# Patient Record
Sex: MALE | Race: ASIAN | HISPANIC OR LATINO | Employment: UNEMPLOYED | URBAN - METROPOLITAN AREA
[De-identification: names, ages, dates, MRNs, and addresses within clinical notes are randomized per-mention and may not be internally consistent; named-entity substitution may affect disease eponyms.]

---

## 2019-10-02 ENCOUNTER — HISTORICAL (OUTPATIENT)
Dept: RADIOLOGY | Facility: HOSPITAL | Age: 24
End: 2019-10-02

## 2019-10-02 LAB
BUN SERPL-MCNC: 21 MG/DL (ref 7–18)
CALCIUM SERPL-MCNC: 9.4 MG/DL (ref 8.5–10.1)
CHLORIDE SERPL-SCNC: 108 MMOL/L (ref 98–107)
CO2 SERPL-SCNC: 27 MMOL/L (ref 21–32)
CREAT SERPL-MCNC: 1 MG/DL (ref 0.6–1.3)
CREAT/UREA NIT SERPL: 21
GLUCOSE SERPL-MCNC: 85 MG/DL (ref 74–106)
POTASSIUM SERPL-SCNC: 4 MMOL/L (ref 3.5–5.1)
SODIUM SERPL-SCNC: 140 MMOL/L (ref 136–145)

## 2019-10-24 ENCOUNTER — HISTORICAL (OUTPATIENT)
Dept: RADIOLOGY | Facility: HOSPITAL | Age: 24
End: 2019-10-24

## 2022-04-10 ENCOUNTER — HISTORICAL (OUTPATIENT)
Dept: ADMINISTRATIVE | Facility: HOSPITAL | Age: 27
End: 2022-04-10

## 2022-04-25 VITALS
WEIGHT: 127.63 LBS | SYSTOLIC BLOOD PRESSURE: 107 MMHG | HEIGHT: 67 IN | DIASTOLIC BLOOD PRESSURE: 71 MMHG | BODY MASS INDEX: 20.03 KG/M2

## 2022-09-08 ENCOUNTER — OFFICE VISIT (OUTPATIENT)
Dept: INTERNAL MEDICINE | Facility: CLINIC | Age: 27
End: 2022-09-08
Payer: MEDICAID

## 2022-09-08 VITALS
TEMPERATURE: 98 F | BODY MASS INDEX: 20.35 KG/M2 | RESPIRATION RATE: 18 BRPM | HEART RATE: 56 BPM | WEIGHT: 126.63 LBS | HEIGHT: 66 IN | SYSTOLIC BLOOD PRESSURE: 100 MMHG | DIASTOLIC BLOOD PRESSURE: 63 MMHG | OXYGEN SATURATION: 100 %

## 2022-09-08 DIAGNOSIS — R10.32 LEFT LOWER QUADRANT PAIN: Primary | ICD-10-CM

## 2022-09-08 DIAGNOSIS — Z13.29 SCREENING FOR THYROID DISORDER: ICD-10-CM

## 2022-09-08 DIAGNOSIS — Z13.21 ENCOUNTER FOR VITAMIN DEFICIENCY SCREENING: ICD-10-CM

## 2022-09-08 DIAGNOSIS — Z00.00 ENCOUNTER FOR WELLNESS EXAMINATION IN ADULT: Primary | ICD-10-CM

## 2022-09-08 DIAGNOSIS — N20.1 URETERIC STONE: ICD-10-CM

## 2022-09-08 DIAGNOSIS — N20.0 NEPHROLITHIASIS: ICD-10-CM

## 2022-09-08 DIAGNOSIS — N13.30 HYDRONEPHROSIS, UNSPECIFIED HYDRONEPHROSIS TYPE: ICD-10-CM

## 2022-09-08 DIAGNOSIS — R30.0 DYSURIA: ICD-10-CM

## 2022-09-08 DIAGNOSIS — R63.0 LOSS OF APPETITE: ICD-10-CM

## 2022-09-08 DIAGNOSIS — N50.812 TESTICULAR PAIN, LEFT: ICD-10-CM

## 2022-09-08 DIAGNOSIS — Z13.1 SCREENING FOR DIABETES MELLITUS (DM): ICD-10-CM

## 2022-09-08 DIAGNOSIS — Z72.0 VAPES NICOTINE CONTAINING SUBSTANCE: ICD-10-CM

## 2022-09-08 DIAGNOSIS — Z11.3 ROUTINE SCREENING FOR STI (SEXUALLY TRANSMITTED INFECTION): ICD-10-CM

## 2022-09-08 DIAGNOSIS — Z13.220 SCREENING FOR LIPID DISORDERS: ICD-10-CM

## 2022-09-08 DIAGNOSIS — Z11.3 SCREENING EXAMINATION FOR STD (SEXUALLY TRANSMITTED DISEASE): ICD-10-CM

## 2022-09-08 PROCEDURE — 1159F PR MEDICATION LIST DOCUMENTED IN MEDICAL RECORD: ICD-10-PCS | Mod: CPTII,,,

## 2022-09-08 PROCEDURE — 3074F PR MOST RECENT SYSTOLIC BLOOD PRESSURE < 130 MM HG: ICD-10-PCS | Mod: CPTII,,,

## 2022-09-08 PROCEDURE — 1160F RVW MEDS BY RX/DR IN RCRD: CPT | Mod: CPTII,,,

## 2022-09-08 PROCEDURE — 99406 PR TOBACCO USE CESSATION INTERMEDIATE 3-10 MINUTES: ICD-10-PCS | Mod: S$PBB,,,

## 2022-09-08 PROCEDURE — 1160F PR REVIEW ALL MEDS BY PRESCRIBER/CLIN PHARMACIST DOCUMENTED: ICD-10-PCS | Mod: CPTII,,,

## 2022-09-08 PROCEDURE — 1159F MED LIST DOCD IN RCRD: CPT | Mod: CPTII,,,

## 2022-09-08 PROCEDURE — 99215 OFFICE O/P EST HI 40 MIN: CPT | Mod: PBBFAC

## 2022-09-08 PROCEDURE — 3078F DIAST BP <80 MM HG: CPT | Mod: CPTII,,,

## 2022-09-08 PROCEDURE — 99406 BEHAV CHNG SMOKING 3-10 MIN: CPT | Mod: S$PBB,,,

## 2022-09-08 PROCEDURE — 3078F PR MOST RECENT DIASTOLIC BLOOD PRESSURE < 80 MM HG: ICD-10-PCS | Mod: CPTII,,,

## 2022-09-08 PROCEDURE — 3074F SYST BP LT 130 MM HG: CPT | Mod: CPTII,,,

## 2022-09-08 PROCEDURE — 99204 OFFICE O/P NEW MOD 45 MIN: CPT | Mod: S$PBB,25,,

## 2022-09-08 PROCEDURE — 3008F PR BODY MASS INDEX (BMI) DOCUMENTED: ICD-10-PCS | Mod: CPTII,,,

## 2022-09-08 PROCEDURE — 99204 PR OFFICE/OUTPT VISIT, NEW, LEVL IV, 45-59 MIN: ICD-10-PCS | Mod: S$PBB,25,,

## 2022-09-08 PROCEDURE — 3008F BODY MASS INDEX DOCD: CPT | Mod: CPTII,,,

## 2022-09-08 RX ORDER — TAMSULOSIN HYDROCHLORIDE 0.4 MG/1
0.4 CAPSULE ORAL DAILY
Qty: 90 CAPSULE | Refills: 1 | Status: SHIPPED | OUTPATIENT
Start: 2022-09-08 | End: 2022-12-08

## 2022-09-08 RX ORDER — MEGESTROL ACETATE 40 MG/ML
200 SUSPENSION ORAL DAILY
Qty: 150 ML | Refills: 11 | Status: SHIPPED | OUTPATIENT
Start: 2022-09-08 | End: 2023-09-08

## 2022-09-08 NOTE — ASSESSMENT & PLAN NOTE
Testicular US ordered-attempting to schedule today or tomorrow.   No masses noted upon palpation.  Cremasteric reflex negative.

## 2022-09-08 NOTE — ASSESSMENT & PLAN NOTE
H/o nephrolithisasis in 2019, CT abd/pelvis without contrast ordered.  Rx flomax.  Uric acid ordered.

## 2022-09-08 NOTE — PROGRESS NOTES
Subjective:       Patient ID: Fidel Wharton is a 27 y.o. male.    Chief Complaint: Establish Care, Abdominal Pain (LLQ), Dysuria, and Testicle Pain    HPI  Fidel Wharton is a 27 y.o.  male presenting in clinic today to Establish Care, Abdominal Pain (LLQ), Dysuria, and Testicle Pain. No previous PCP. PMH ureteral stone.     On 8/7/2019, CT abdomen showed Small right UVJ calculus with mild obstructive features. 10/3/2019-IV pyelogram shows Punctate nonobstructing right lower pole calcifications. IV pyelogram on 10/2/2019 Punctate nonobstructing right lower pole calcifications. He wa spreviously followed in [facility] urology clinic, he was started on flomax at that time with relief. He says he passed a few stones. He did not follow up since then. He states he is currently having LLQ pain. He feels his urinary stream is strong, but it does take a while for his stream to start. Denies blood or pink tinged urine. He is having left sided testicular pain. He is able to have and maintain an erection without pain. He denies blood tinged ejaculate or painful ejaculations.     He says he has not been having an appetite. He says he has lost approximately 10 pounds in 2-3 months. He says his stomach is empty, but he does not feel like eating.     Electric cigarette and marijuana use. Denies alcohol use. Declines flu or tetanus vaccines. Denies chest pain, shortness of breath, cough, headache, dizziness, weakness, nausea, vomiting, diarrhea, constipation, depression, anxiety, SI, and HI.    Prostate Cancer Screening - PSA ordered. Follow up annually.   Colon Cancer Screening - Deferred due to age. Will initiate testing at age 47.  Vaccinations: Flu - Declines. / Tetanus - Declines / Covid - 10/22/2021, 12/7/2021    Review of Systems   Constitutional:  Positive for appetite change and unexpected weight change.   HENT: Negative.     Eyes: Negative.    Respiratory: Negative.     Cardiovascular: Negative.    Gastrointestinal:   Positive for abdominal pain.   Endocrine: Negative.    Genitourinary:  Positive for dysuria and testicular pain. Negative for discharge, erectile dysfunction, hematuria, penile pain and scrotal swelling.   Musculoskeletal: Negative.    Integumentary:  Negative.   Allergic/Immunologic: Negative.    Neurological: Negative.    Hematological: Negative.    Psychiatric/Behavioral: Negative.     All other systems reviewed and are negative.      Objective:      Physical Exam  Exam conducted with a chaperone present (JEN Smith LPN).   Constitutional:       Appearance: Normal appearance. He is underweight.   HENT:      Head: Normocephalic.      Nose: Nose normal.      Mouth/Throat:      Mouth: Mucous membranes are moist.      Pharynx: Oropharynx is clear.   Eyes:      Extraocular Movements: Extraocular movements intact.      Conjunctiva/sclera: Conjunctivae normal.      Pupils: Pupils are equal, round, and reactive to light.   Cardiovascular:      Rate and Rhythm: Normal rate and regular rhythm.      Heart sounds: Normal heart sounds.   Pulmonary:      Effort: Pulmonary effort is normal.      Breath sounds: Normal breath sounds.   Abdominal:      General: Abdomen is flat. Bowel sounds are normal.      Palpations: Abdomen is soft.   Genitourinary:     Testes: Cremasteric reflex is present.         Left: Tenderness present. Mass or swelling not present. Left testis is descended.   Musculoskeletal:         General: Normal range of motion.      Cervical back: Normal range of motion.   Skin:     General: Skin is warm and dry.      Capillary Refill: Capillary refill takes less than 2 seconds.   Neurological:      General: No focal deficit present.      Mental Status: He is alert and oriented to person, place, and time.   Psychiatric:         Mood and Affect: Mood normal.       Assessment and Plan:       Problem List Items Addressed This Visit          Renal/    Nephrolithiasis     H/o nephrolithisasis in 2019, CT abd/pelvis  "without contrast ordered.  Rx flomax.  Uric acid ordered.         Dysuria     Palpable lymph node chains on each side of groin.  See "nephrolithiasis."  PSA and UA ordered.           Relevant Medications    tamsulosin (FLOMAX) 0.4 mg Cap    Other Relevant Orders    Ambulatory referral/consult to Urology    PSA, Screening    Uric Acid    Testicular pain, left     Testicular US ordered-attempting to schedule today or tomorrow.   No masses noted upon palpation.  Cremasteric reflex negative.           Relevant Orders    US Scrotum And Testicles       ID    Routine screening for STI (sexually transmitted infection)     HIV, syphilis, trichomoniasis, HSV ordered.         Relevant Orders    T.vaginalisisc, Amplified RNA    HSV 1 & 2, IgG       Endocrine    BMI 20.0-20.9, adult     Body mass index is 20.23 kg/m². (At goal).              GI    Left lower quadrant pain - Primary     See "nephrolithiasis."  No rebound tenderness.         Relevant Orders    CT Abdomen Pelvis  Without Contrast    Ambulatory referral/consult to Urology       Other    Loss of appetite     Rx megesterol.  Drink ensure or boost 2-3 times per day for added calories.  Daily weight.         Relevant Medications    megestroL (MEGACE) 400 mg/10 mL (40 mg/mL) Susp    Vapes nicotine containing substance     Vaping cessation discussed for 3 minutes.                 RTC in 3 months and prn.  Labs within a week of visit.    ELOY Bustos  9/8/22      "

## 2022-09-08 NOTE — ASSESSMENT & PLAN NOTE
"Palpable lymph node chains on each side of groin.  See "nephrolithiasis."  PSA and UA ordered.    "

## 2022-09-09 ENCOUNTER — PATIENT MESSAGE (OUTPATIENT)
Dept: INTERNAL MEDICINE | Facility: CLINIC | Age: 27
End: 2022-09-09
Payer: MEDICAID

## 2022-09-09 DIAGNOSIS — R31.21 ASYMPTOMATIC MICROSCOPIC HEMATURIA: ICD-10-CM

## 2022-09-09 DIAGNOSIS — Z00.00 ENCOUNTER FOR WELLNESS EXAMINATION IN ADULT: Primary | ICD-10-CM

## 2022-09-12 NOTE — TELEPHONE ENCOUNTER
Mr. Fidel Wharton did not read his portal message. Can we please relate the previous message to him?    Thank you,    HECTOR Tubbs

## 2022-09-23 NOTE — TELEPHONE ENCOUNTER
2 nd Attempted to contact pt at number listed in chart, no answer. Left a VM to call back to [facility].

## 2022-09-27 ENCOUNTER — PATIENT MESSAGE (OUTPATIENT)
Dept: INTERNAL MEDICINE | Facility: CLINIC | Age: 27
End: 2022-09-27
Payer: MEDICAID

## 2022-09-27 NOTE — TELEPHONE ENCOUNTER
3 rd Attempted to contact pt at number listed in chart, no answer. Left a VM to call back to [facility]. Pt letter mailed

## 2022-09-30 ENCOUNTER — PATIENT MESSAGE (OUTPATIENT)
Dept: INTERNAL MEDICINE | Facility: CLINIC | Age: 27
End: 2022-09-30
Payer: MEDICAID

## 2022-12-05 ENCOUNTER — HOSPITAL ENCOUNTER (OUTPATIENT)
Dept: RADIOLOGY | Facility: HOSPITAL | Age: 27
Discharge: HOME | End: 2022-12-05
Payer: MEDICAID

## 2022-12-05 DIAGNOSIS — R10.32 LEFT LOWER QUADRANT PAIN: ICD-10-CM

## 2022-12-05 PROCEDURE — 74176 CT ABD & PELVIS W/O CONTRAST: CPT | Mod: TC

## 2022-12-06 ENCOUNTER — PATIENT MESSAGE (OUTPATIENT)
Dept: INTERNAL MEDICINE | Facility: CLINIC | Age: 27
End: 2022-12-06
Payer: MEDICAID

## 2022-12-08 ENCOUNTER — OFFICE VISIT (OUTPATIENT)
Dept: UROLOGY | Facility: CLINIC | Age: 27
End: 2022-12-08
Payer: MEDICAID

## 2022-12-08 VITALS
WEIGHT: 131.81 LBS | DIASTOLIC BLOOD PRESSURE: 72 MMHG | TEMPERATURE: 98 F | OXYGEN SATURATION: 100 % | HEIGHT: 66 IN | HEART RATE: 71 BPM | BODY MASS INDEX: 21.18 KG/M2 | RESPIRATION RATE: 20 BRPM | SYSTOLIC BLOOD PRESSURE: 106 MMHG

## 2022-12-08 DIAGNOSIS — R10.32 LEFT LOWER QUADRANT PAIN: ICD-10-CM

## 2022-12-08 DIAGNOSIS — R30.0 DYSURIA: ICD-10-CM

## 2022-12-08 DIAGNOSIS — N20.0 KIDNEY STONES: Primary | ICD-10-CM

## 2022-12-08 PROCEDURE — 3074F SYST BP LT 130 MM HG: CPT | Mod: CPTII,,, | Performed by: NURSE PRACTITIONER

## 2022-12-08 PROCEDURE — 3078F PR MOST RECENT DIASTOLIC BLOOD PRESSURE < 80 MM HG: ICD-10-PCS | Mod: CPTII,,, | Performed by: NURSE PRACTITIONER

## 2022-12-08 PROCEDURE — 3008F BODY MASS INDEX DOCD: CPT | Mod: CPTII,,, | Performed by: NURSE PRACTITIONER

## 2022-12-08 PROCEDURE — 99204 PR OFFICE/OUTPT VISIT, NEW, LEVL IV, 45-59 MIN: ICD-10-PCS | Mod: S$PBB,,, | Performed by: NURSE PRACTITIONER

## 2022-12-08 PROCEDURE — 99214 OFFICE O/P EST MOD 30 MIN: CPT | Mod: PBBFAC | Performed by: NURSE PRACTITIONER

## 2022-12-08 PROCEDURE — 1160F PR REVIEW ALL MEDS BY PRESCRIBER/CLIN PHARMACIST DOCUMENTED: ICD-10-PCS | Mod: CPTII,,, | Performed by: NURSE PRACTITIONER

## 2022-12-08 PROCEDURE — 1159F MED LIST DOCD IN RCRD: CPT | Mod: CPTII,,, | Performed by: NURSE PRACTITIONER

## 2022-12-08 PROCEDURE — 1159F PR MEDICATION LIST DOCUMENTED IN MEDICAL RECORD: ICD-10-PCS | Mod: CPTII,,, | Performed by: NURSE PRACTITIONER

## 2022-12-08 PROCEDURE — 3078F DIAST BP <80 MM HG: CPT | Mod: CPTII,,, | Performed by: NURSE PRACTITIONER

## 2022-12-08 PROCEDURE — 3074F PR MOST RECENT SYSTOLIC BLOOD PRESSURE < 130 MM HG: ICD-10-PCS | Mod: CPTII,,, | Performed by: NURSE PRACTITIONER

## 2022-12-08 PROCEDURE — 1160F RVW MEDS BY RX/DR IN RCRD: CPT | Mod: CPTII,,, | Performed by: NURSE PRACTITIONER

## 2022-12-08 PROCEDURE — 99204 OFFICE O/P NEW MOD 45 MIN: CPT | Mod: S$PBB,,, | Performed by: NURSE PRACTITIONER

## 2022-12-08 PROCEDURE — 3008F PR BODY MASS INDEX (BMI) DOCUMENTED: ICD-10-PCS | Mod: CPTII,,, | Performed by: NURSE PRACTITIONER

## 2022-12-08 RX ORDER — KETOROLAC TROMETHAMINE 10 MG/1
10 TABLET, FILM COATED ORAL EVERY 6 HOURS
Qty: 24 TABLET | Refills: 0 | Status: SHIPPED | OUTPATIENT
Start: 2022-12-08 | End: 2022-12-13

## 2022-12-08 RX ORDER — TAMSULOSIN HYDROCHLORIDE 0.4 MG/1
0.4 CAPSULE ORAL DAILY
Qty: 30 CAPSULE | Refills: 11 | Status: SHIPPED | OUTPATIENT
Start: 2022-12-08 | End: 2023-02-06 | Stop reason: ALTCHOICE

## 2022-12-08 NOTE — PROGRESS NOTES
Chief Complaint:   Chief Complaint   Patient presents with    hematuria- burning in urine       HPI:  Patient is a 27-year-old male referred to Urology for hematuria and burning with urination.  Patient earlier last week burning with urination along with red blood gross hematuria.  A CT abdomen pelvis performed on 12/05/2022 revealed a stone at the left distal UVJ and multiple stone bilateral kidney 1 being a left hallux region.  Today patient denied dysuria, urinary urgency, frequency, incontinence, retention, gross hematuria, nocturia. Patient denies family history of urologic pathology.       Allergies:  Review of patient's allergies indicates:   Allergen Reactions    Corticosteroids (glucocorticoids) Hives       Medications:  Current Outpatient Medications   Medication Sig Dispense Refill    megestroL (MEGACE) 400 mg/10 mL (40 mg/mL) Susp Take 5 mLs (200 mg total) by mouth once daily. 150 mL 11    tamsulosin (FLOMAX) 0.4 mg Cap Take 1 capsule (0.4 mg total) by mouth once daily. 90 capsule 1     No current facility-administered medications for this visit.       Review of Systems:  General: No fever, chills, fatigability, or weight loss.  Skin: No rashes, itching, or changes in color or texture of skin.  Chest: Denies TOVAR, cyanosis, wheezing, cough, and sputum production.  Abdomen: Appetite fine. No weight loss. Denies diarrhea, abdominal pain, hematemesis, or blood in stool.  Musculoskeletal: No joint stiffness or swelling. Denies back pain.  : As above.  All other review of systems negative.    PMH:  Past Medical History:   Diagnosis Date    Hydronephrosis     RENAL    Ureteric stone        PSH:  Past Surgical History:   Procedure Laterality Date    FINGER SURGERY         FamHx:  No family history on file.    SocHx:  Social History     Socioeconomic History    Marital status:    Tobacco Use    Smoking status: Former     Types: Cigarettes, Vaping with nicotine     Passive exposure: Past    Smokeless  tobacco: Never   Substance and Sexual Activity    Alcohol use: Never    Drug use: Yes     Types: Marijuana    Sexual activity: Yes     Partners: Female       Physical Exam:  There were no vitals filed for this visit.  General: A&Ox3, no apparent distress, no deformities  Neck: No masses, normal thyroid  Lungs: CTA marilu, no use of accessory muscles  Heart: RRR, no arrhythmias  Abdomen: Soft, NT, ND, no masses, no hernias, no hepatosplenomegaly  Lymphatic: Neck and groin nodes negative  Skin: The skin is warm and dry. No jaundice.  Ext: No c/c/e.    GUMale: Test desc marilu, no abnormalities of epididymus. Penis, with normal penile and scrotal skin. Meatus normal. Normal rectal tone, no hemorrhoids. Prostate: finger breadth wide, smooth, soft, nontender, no nodules or masses appreciated. SV not palpable. Perineum and anus normal.        Imaging:  Abdomen pelvis without contrast revealed:There is no hydronephrosis.  There are several small calculi in both kidneys.  Largest in each kidney measures 3-4 mm.  No ureteral calculi are seen.      Impression:  Kidney stone      Plan: Instructed patient continue Flomax 0.4 mg p.o. daily and p.r.n. Toradol mg q.6 hours.  Strain all urine to collect kidney stones present. RTC 6 months with KUB.

## 2022-12-08 NOTE — PROGRESS NOTES
Placed in room. Seen by Mundo Smalls NP. Spoke with patient. Patient given strainer for kidney stones. Will f/u in 6 months with KUB.

## 2023-02-03 DIAGNOSIS — R31.21 ASYMPTOMATIC MICROSCOPIC HEMATURIA: ICD-10-CM

## 2023-02-03 DIAGNOSIS — Z00.00 ENCOUNTER FOR WELLNESS EXAMINATION IN ADULT: Primary | ICD-10-CM

## 2023-02-03 RX ORDER — OXYCODONE AND ACETAMINOPHEN 5; 325 MG/1; MG/1
TABLET ORAL
COMMUNITY
Start: 2022-10-05 | End: 2023-02-06 | Stop reason: ALTCHOICE

## 2023-02-03 RX ORDER — TALC
3 POWDER (GRAM) TOPICAL NIGHTLY
COMMUNITY

## 2023-02-03 RX ORDER — ONDANSETRON 4 MG/1
4 TABLET, ORALLY DISINTEGRATING ORAL EVERY 6 HOURS PRN
COMMUNITY
Start: 2022-10-05 | End: 2023-02-06 | Stop reason: SDUPTHER

## 2023-02-06 ENCOUNTER — OFFICE VISIT (OUTPATIENT)
Dept: INTERNAL MEDICINE | Facility: CLINIC | Age: 28
End: 2023-02-06
Payer: MEDICAID

## 2023-02-06 VITALS
HEIGHT: 66 IN | DIASTOLIC BLOOD PRESSURE: 64 MMHG | RESPIRATION RATE: 18 BRPM | BODY MASS INDEX: 21.04 KG/M2 | WEIGHT: 130.94 LBS | OXYGEN SATURATION: 96 % | HEART RATE: 70 BPM | SYSTOLIC BLOOD PRESSURE: 100 MMHG | TEMPERATURE: 98 F

## 2023-02-06 DIAGNOSIS — Z13.1 SCREENING FOR DIABETES MELLITUS: ICD-10-CM

## 2023-02-06 DIAGNOSIS — Z13.29 SCREENING FOR THYROID DISORDER: ICD-10-CM

## 2023-02-06 DIAGNOSIS — R31.9 HEMATURIA, UNSPECIFIED TYPE: Primary | ICD-10-CM

## 2023-02-06 DIAGNOSIS — Z13.220 SCREENING FOR LIPID DISORDERS: ICD-10-CM

## 2023-02-06 DIAGNOSIS — R11.2 NAUSEA AND VOMITING, UNSPECIFIED VOMITING TYPE: ICD-10-CM

## 2023-02-06 DIAGNOSIS — D64.9 NORMOCYTIC ANEMIA: ICD-10-CM

## 2023-02-06 DIAGNOSIS — D72.819 LEUKOPENIA, UNSPECIFIED TYPE: ICD-10-CM

## 2023-02-06 DIAGNOSIS — Z13.21 ENCOUNTER FOR VITAMIN DEFICIENCY SCREENING: ICD-10-CM

## 2023-02-06 PROBLEM — D70.9 NEUTROPENIA: Status: ACTIVE | Noted: 2023-02-06

## 2023-02-06 PROCEDURE — 3074F SYST BP LT 130 MM HG: CPT | Mod: CPTII,,,

## 2023-02-06 PROCEDURE — 3074F PR MOST RECENT SYSTOLIC BLOOD PRESSURE < 130 MM HG: ICD-10-PCS | Mod: CPTII,,,

## 2023-02-06 PROCEDURE — 3078F DIAST BP <80 MM HG: CPT | Mod: CPTII,,,

## 2023-02-06 PROCEDURE — 1159F PR MEDICATION LIST DOCUMENTED IN MEDICAL RECORD: ICD-10-PCS | Mod: CPTII,,,

## 2023-02-06 PROCEDURE — 99214 PR OFFICE/OUTPT VISIT, EST, LEVL IV, 30-39 MIN: ICD-10-PCS | Mod: 95,S$PBB,,

## 2023-02-06 PROCEDURE — 99214 OFFICE O/P EST MOD 30 MIN: CPT | Mod: 95,S$PBB,,

## 2023-02-06 PROCEDURE — 1159F MED LIST DOCD IN RCRD: CPT | Mod: CPTII,,,

## 2023-02-06 PROCEDURE — 3008F PR BODY MASS INDEX (BMI) DOCUMENTED: ICD-10-PCS | Mod: CPTII,,,

## 2023-02-06 PROCEDURE — 1160F PR REVIEW ALL MEDS BY PRESCRIBER/CLIN PHARMACIST DOCUMENTED: ICD-10-PCS | Mod: CPTII,,,

## 2023-02-06 PROCEDURE — 1160F RVW MEDS BY RX/DR IN RCRD: CPT | Mod: CPTII,,,

## 2023-02-06 PROCEDURE — 3078F PR MOST RECENT DIASTOLIC BLOOD PRESSURE < 80 MM HG: ICD-10-PCS | Mod: CPTII,,,

## 2023-02-06 PROCEDURE — 99214 OFFICE O/P EST MOD 30 MIN: CPT | Mod: PBBFAC

## 2023-02-06 PROCEDURE — 3008F BODY MASS INDEX DOCD: CPT | Mod: CPTII,,,

## 2023-02-06 RX ORDER — ONDANSETRON 4 MG/1
4 TABLET, ORALLY DISINTEGRATING ORAL EVERY 6 HOURS PRN
Qty: 45 TABLET | Refills: 1 | Status: SHIPPED | OUTPATIENT
Start: 2023-02-06

## 2023-02-06 NOTE — PROGRESS NOTES
PATIENT NAME: Fidel Wharton  : 1995  DATE: 23  MRN: [id number]          Reason for Visit/Chief Complaint   Follow-up, Labs Only, and Hematuria       History of Present Illness (HPI)     Fidel Wharton is a 28 y.o. male presenting in clinic today for Follow-up, Labs Only, and Hematuria  PMH ureteral stone. He is followed by [facility] urology clinic. He did not complete labs prior to visit. He is requesting a refill for ondansetron.     He states his appetite increased after taking megestrol. He states he had blood in urine approximately 2 weeks ago for approximately 5 days. CT abdomen/pelvis taken on 2022 reveals bilateral nephrolithiasis.  He reports continuing to strain his urine for stones, but has not seen a stone. He denies flank pain or dysuria.     Electric cigarette and marijuana use. Denies alcohol use. Declines flu or tetanus vaccines. Denies chest pain, shortness of breath, cough, headache, dizziness, weakness, nausea, vomiting, diarrhea, constipation, depression, anxiety, SI, and HI.     Prostate Cancer Screening - 2022-PSA: 0.32. Follow up annually.   Colon Cancer Screening - Deferred due to age. Will initiate testing at age 47.  Vaccinations: Flu - Declines. / Tetanus - Declines / Covid - 10/22/2021, 2021    Review of Systems     Review of Systems   Constitutional: Negative.    HENT: Negative.     Eyes: Negative.    Respiratory: Negative.     Cardiovascular: Negative.    Gastrointestinal: Negative.    Endocrine: Negative.    Genitourinary:  Positive for hematuria. Negative for difficulty urinating.   Musculoskeletal: Negative.    Skin: Negative.    Allergic/Immunologic: Negative.    Neurological: Negative.    Hematological: Negative.    Psychiatric/Behavioral: Negative.     All other systems reviewed and are negative.    Medical / Social / Family History     Past Medical History:   Diagnosis Date    Hydronephrosis     RENAL    Ureteric stone          Past Surgical History:  "  Procedure Laterality Date    FINGER SURGERY           Social History  Fidel Wharton's  reports that he has been smoking vaping with nicotine. He has been exposed to tobacco smoke. He has never used smokeless tobacco. He reports current drug use. Drug: Marijuana. He reports that he does not drink alcohol.    Family History  Fidel Wharton's family history is not on file.    Medications and Allergies     Medications  Current Outpatient Medications   Medication Instructions    megestroL (MEGACE) 200 mg, Oral, Daily    melatonin (MELATIN) 3 mg, Oral, Nightly    ondansetron (ZOFRAN-ODT) 4 mg, Oral, Every 6 hours PRN    tamsulosin (FLOMAX) 0.4 mg, Oral, Daily       Allergies  Review of patient's allergies indicates:   Allergen Reactions    Corticosteroids (glucocorticoids) Hives       Physical Examination   Visit Vitals  /64 (BP Location: Left arm, Patient Position: Sitting, BP Method: Small (Automatic))   Pulse 70   Temp 98.3 °F (36.8 °C) (Oral)   Resp 18   Ht 5' 6" (1.676 m)   Wt 59.4 kg (130 lb 15.3 oz)   SpO2 96%   BMI 21.14 kg/m²     Physical Exam  Vitals reviewed.   Constitutional:       Appearance: Normal appearance. He is normal weight.   HENT:      Head: Normocephalic.      Nose: Nose normal.      Mouth/Throat:      Mouth: Mucous membranes are moist.      Pharynx: Oropharynx is clear.   Eyes:      Extraocular Movements: Extraocular movements intact.      Conjunctiva/sclera: Conjunctivae normal.      Pupils: Pupils are equal, round, and reactive to light.   Cardiovascular:      Rate and Rhythm: Normal rate and regular rhythm.      Heart sounds: Normal heart sounds.   Pulmonary:      Effort: Pulmonary effort is normal.      Breath sounds: Normal breath sounds.   Abdominal:      General: Abdomen is flat. Bowel sounds are normal.      Palpations: Abdomen is soft.   Musculoskeletal:         General: Normal range of motion.      Cervical back: Normal range of motion.   Skin:     General: Skin is warm and dry.    "   Capillary Refill: Capillary refill takes less than 2 seconds.   Neurological:      General: No focal deficit present.      Mental Status: He is alert and oriented to person, place, and time.   Psychiatric:         Mood and Affect: Mood normal.         Results     Lab Results   Component Value Date    WBC 4.1 (L) 02/06/2023    RBC 4.22 (L) 02/06/2023    HGB 13.3 (L) 02/06/2023    HCT 38.2 (L) 02/06/2023    MCV 90.5 02/06/2023    MCH 31.5 02/06/2023    MCHC 34.8 02/06/2023    RDW 12.3 02/06/2023     02/06/2023    MPV 10.2 02/06/2023      Lab Results   Component Value Date     02/06/2023    K 3.7 02/06/2023    CHLORIDE 105 02/06/2023    CO2 26 02/06/2023    GLUCOSE 107 (H) 02/06/2023    BUN 16.1 02/06/2023    CREATININE 0.90 02/06/2023    LABPROT 7.3 02/06/2023    ALBUMIN 4.5 02/06/2023    BILITOT 0.4 02/06/2023    ALKPHOS 59 02/06/2023    AST 22 02/06/2023    ALT 25 02/06/2023    EGFRNORACEVR >60 02/06/2023     Lab Results   Component Value Date    TSH 2.1650 09/08/2022     Lab Results   Component Value Date    CHOL 135 09/08/2022    HDL 39 09/08/2022    LDL 79.00 09/08/2022    TRIG 85 09/08/2022     Lab Results   Component Value Date    COLORUA Light-Yellow 02/06/2023    SGUA 1.021 02/06/2023    PROTEINUA Negative 02/06/2023    GLUCOSEUA Normal 02/06/2023    BILIRUBINUA Negative 02/06/2023    BLOODUA Negative 02/06/2023    WBCUA 0-5 02/06/2023    RBCUA 0-5 02/06/2023    BACTERIA None Seen 02/06/2023    NITRITESUA Negative 02/06/2023    LEUKOCYTESUR Negative 02/06/2023    UROBILINOGEN Normal 02/06/2023     Lab Results   Component Value Date    CREATRANDUR 186.4 (H) 09/08/2022    MICALBCREAT 17.3 09/08/2022     Lab Results   Component Value Date    PWNVTTAL30HC 40.5 09/08/2022     Lab Results   Component Value Date    HIV Nonreactive 09/08/2022    HEPAIGM Nonreactive 09/08/2022    HEPBCOREM Nonreactive 09/08/2022    HEPCAB Nonreactive 09/08/2022     Assessment and Plan (including Health Maintenance)      1. Hematuria, unspecified type  - Microalbumin/Creatinine Ratio, Urine; Future  - Urinalysis; Future  - Urine culture; Future  - Cytology, Urine; Future    2. Normocytic anemia  Lab Results   Component Value Date    HCT 38.2 (L) 02/06/2023    HGB 13.3 (L) 02/06/2023    FERRITIN 129.58 09/08/2022    TIBC 303 09/08/2022    LABIRON 30 09/08/2022     - CBC Auto Differential; Future  - Ferritin; Future  - Iron and TIBC; Future  - Reticulocytes; Future  - Path Review, Peripheral Smear; Future  - Haptoglobin; Future    3. Leukopenia, unspecified type  - CBC Auto Differential; Future    4. Nausea and vomiting, unspecified vomiting type  Refilled today.   - ondansetron (ZOFRAN-ODT) 4 MG TbDL; Take 1 tablet (4 mg total) by mouth every 6 (six) hours as needed (nausea or vomiting).  Dispense: 45 tablet; Refill: 1  - Comprehensive Metabolic Panel; Future    5. BMI 21.0-21.9, adult  Body mass index is 21.14 kg/m². (At goal).     6. Screening for diabetes mellitus  - Hemoglobin A1C; Future    7. Screening for lipid disorders  - Lipid Panel; Future    8. Encounter for vitamin deficiency screening  - Vitamin D; Future    9. Screening for thyroid disorder  - T4, Free; Future  - TSH; Future      Health Maintenance Due   Topic Date Due    Pneumococcal Vaccines (Age 0-64) (1 - PCV) Never done    HPV Vaccines (1 - Male 2-dose series) Never done    COVID-19 Vaccine (3 - Booster for Pfizer series) 02/01/2022     All of your core healthy metrics are met.      The patient has no Health Maintenance topics of status Not Due    Future Appointments   Date Time Provider Department Center   6/8/2023  1:30 PM Ab Smalls NP [facility]   8/7/2023 12:00 PM ELOY Bustos [facility]        Follow up in about 6 months (around 8/6/2023) for F2F, Lab review, Wellness.  RTC PRN.  Labs within a week of visit.        Signature:        ELOY Bustos  [facility]  [address] [facility] [id number]    Date of encounter: 2/6/23

## 2023-02-08 ENCOUNTER — PATIENT MESSAGE (OUTPATIENT)
Dept: INTERNAL MEDICINE | Facility: CLINIC | Age: 28
End: 2023-02-08
Payer: MEDICAID

## 2023-02-15 ENCOUNTER — TELEPHONE (OUTPATIENT)
Dept: INTERNAL MEDICINE | Facility: CLINIC | Age: 28
End: 2023-02-15
Payer: MEDICAID

## 2023-06-11 ENCOUNTER — PATIENT MESSAGE (OUTPATIENT)
Dept: UROLOGY | Facility: CLINIC | Age: 28
End: 2023-06-11
Payer: MEDICAID

## 2023-06-12 DIAGNOSIS — N20.0 KIDNEY STONES: Primary | ICD-10-CM

## 2023-06-14 ENCOUNTER — LAB VISIT (OUTPATIENT)
Dept: LAB | Facility: HOSPITAL | Age: 28
End: 2023-06-14
Attending: NURSE PRACTITIONER
Payer: MEDICAID

## 2023-06-14 DIAGNOSIS — N20.0 KIDNEY STONES: ICD-10-CM

## 2023-06-14 PROCEDURE — 82365 CALCULUS SPECTROSCOPY: CPT

## 2023-06-16 LAB
CELL MATERIAL STONE EST-MCNT: NORMAL %
LABORATORY COMMENT REPORT: NORMAL
SPECIMEN SOURCE: NORMAL

## 2024-08-16 ENCOUNTER — TELEPHONE (OUTPATIENT)
Dept: INTERNAL MEDICINE | Facility: CLINIC | Age: 29
End: 2024-08-16
Payer: MEDICAID